# Patient Record
Sex: FEMALE | Race: WHITE | Employment: PART TIME | URBAN - METROPOLITAN AREA
[De-identification: names, ages, dates, MRNs, and addresses within clinical notes are randomized per-mention and may not be internally consistent; named-entity substitution may affect disease eponyms.]

---

## 2023-09-08 ENCOUNTER — HOSPITAL ENCOUNTER (EMERGENCY)
Facility: HOSPITAL | Age: 28
Discharge: HOME | End: 2023-09-08
Attending: STUDENT IN AN ORGANIZED HEALTH CARE EDUCATION/TRAINING PROGRAM

## 2023-09-08 ENCOUNTER — APPOINTMENT (OUTPATIENT)
Facility: HOSPITAL | Age: 28
End: 2023-09-08

## 2023-09-08 VITALS
RESPIRATION RATE: 18 BRPM | HEIGHT: 60 IN | OXYGEN SATURATION: 97 % | SYSTOLIC BLOOD PRESSURE: 114 MMHG | BODY MASS INDEX: 25.02 KG/M2 | TEMPERATURE: 98.2 F | DIASTOLIC BLOOD PRESSURE: 81 MMHG | WEIGHT: 127.43 LBS | HEART RATE: 94 BPM

## 2023-09-08 DIAGNOSIS — N83.202 CYST OF LEFT OVARY: Primary | ICD-10-CM

## 2023-09-08 LAB
ALBUMIN SERPL-MCNC: 3.8 G/DL (ref 3.5–5)
ALBUMIN/GLOB SERPL: 1 (ref 1.1–2.2)
ALP SERPL-CCNC: 60 U/L (ref 45–117)
ALT SERPL-CCNC: 18 U/L (ref 12–78)
ANION GAP SERPL CALC-SCNC: 6 MMOL/L (ref 5–15)
APPEARANCE UR: ABNORMAL
AST SERPL-CCNC: 9 U/L (ref 15–37)
BACTERIA URNS QL MICRO: ABNORMAL /HPF
BASOPHILS # BLD: 0 K/UL (ref 0–0.1)
BASOPHILS NFR BLD: 0 % (ref 0–1)
BILIRUB SERPL-MCNC: 0.6 MG/DL (ref 0.2–1)
BILIRUB UR QL: NEGATIVE
BUN SERPL-MCNC: 10 MG/DL (ref 6–20)
BUN/CREAT SERPL: 11 (ref 12–20)
CALCIUM SERPL-MCNC: 9.1 MG/DL (ref 8.5–10.1)
CHLORIDE SERPL-SCNC: 106 MMOL/L (ref 97–108)
CO2 SERPL-SCNC: 27 MMOL/L (ref 21–32)
COLOR UR: ABNORMAL
COMMENT:: NORMAL
CREAT SERPL-MCNC: 0.93 MG/DL (ref 0.55–1.02)
DIFFERENTIAL METHOD BLD: ABNORMAL
EOSINOPHIL # BLD: 0.1 K/UL (ref 0–0.4)
EOSINOPHIL NFR BLD: 1 % (ref 0–7)
EPITH CASTS URNS QL MICRO: ABNORMAL /LPF
ERYTHROCYTE [DISTWIDTH] IN BLOOD BY AUTOMATED COUNT: 14.1 % (ref 11.5–14.5)
GLOBULIN SER CALC-MCNC: 3.7 G/DL (ref 2–4)
GLUCOSE SERPL-MCNC: 103 MG/DL (ref 65–100)
GLUCOSE UR STRIP.AUTO-MCNC: NEGATIVE MG/DL
HCG UR QL: NEGATIVE
HCT VFR BLD AUTO: 50.2 % (ref 35–47)
HGB BLD-MCNC: 16.3 G/DL (ref 11.5–16)
HGB UR QL STRIP: ABNORMAL
IMM GRANULOCYTES # BLD AUTO: 0 K/UL
IMM GRANULOCYTES NFR BLD AUTO: 0 %
KETONES UR QL STRIP.AUTO: ABNORMAL MG/DL
LEUKOCYTE ESTERASE UR QL STRIP.AUTO: NEGATIVE
LIPASE SERPL-CCNC: 69 U/L (ref 73–393)
LYMPHOCYTES # BLD: 3.2 K/UL (ref 0.8–3.5)
LYMPHOCYTES NFR BLD: 26 % (ref 12–49)
MCH RBC QN AUTO: 26.4 PG (ref 26–34)
MCHC RBC AUTO-ENTMCNC: 32.5 G/DL (ref 30–36.5)
MCV RBC AUTO: 81.2 FL (ref 80–99)
MONOCYTES # BLD: 1 K/UL (ref 0–1)
MONOCYTES NFR BLD: 8 % (ref 5–13)
MUCOUS THREADS URNS QL MICRO: ABNORMAL /LPF
NEUTS SEG # BLD: 7.9 K/UL (ref 1.8–8)
NEUTS SEG NFR BLD: 65 % (ref 32–75)
NITRITE UR QL STRIP.AUTO: NEGATIVE
PH UR STRIP: 5.5 (ref 5–8)
PLATELET # BLD AUTO: 209 K/UL (ref 150–400)
POTASSIUM SERPL-SCNC: 3.7 MMOL/L (ref 3.5–5.1)
PROT SERPL-MCNC: 7.5 G/DL (ref 6.4–8.2)
PROT UR STRIP-MCNC: ABNORMAL MG/DL
RBC # BLD AUTO: 6.18 M/UL (ref 3.8–5.2)
RBC #/AREA URNS HPF: ABNORMAL /HPF (ref 0–5)
RBC MORPH BLD: ABNORMAL
SODIUM SERPL-SCNC: 139 MMOL/L (ref 136–145)
SP GR UR REFRACTOMETRY: 1.02 (ref 1–1.03)
SPECIMEN HOLD: NORMAL
SPECIMEN HOLD: NORMAL
UROBILINOGEN UR QL STRIP.AUTO: 1 EU/DL (ref 0.2–1)
WBC # BLD AUTO: 12.2 K/UL (ref 3.6–11)
WBC URNS QL MICRO: ABNORMAL /HPF (ref 0–4)

## 2023-09-08 PROCEDURE — 76856 US EXAM PELVIC COMPLETE: CPT

## 2023-09-08 PROCEDURE — 76830 TRANSVAGINAL US NON-OB: CPT

## 2023-09-08 PROCEDURE — 99285 EMERGENCY DEPT VISIT HI MDM: CPT

## 2023-09-08 PROCEDURE — 80053 COMPREHEN METABOLIC PANEL: CPT

## 2023-09-08 PROCEDURE — 36415 COLL VENOUS BLD VENIPUNCTURE: CPT

## 2023-09-08 PROCEDURE — 96374 THER/PROPH/DIAG INJ IV PUSH: CPT

## 2023-09-08 PROCEDURE — 2580000003 HC RX 258

## 2023-09-08 PROCEDURE — 6360000002 HC RX W HCPCS

## 2023-09-08 PROCEDURE — 81001 URINALYSIS AUTO W/SCOPE: CPT

## 2023-09-08 PROCEDURE — 83690 ASSAY OF LIPASE: CPT

## 2023-09-08 PROCEDURE — 96375 TX/PRO/DX INJ NEW DRUG ADDON: CPT

## 2023-09-08 PROCEDURE — 85025 COMPLETE CBC W/AUTO DIFF WBC: CPT

## 2023-09-08 PROCEDURE — 6360000004 HC RX CONTRAST MEDICATION: Performed by: EMERGENCY MEDICINE

## 2023-09-08 PROCEDURE — 74177 CT ABD & PELVIS W/CONTRAST: CPT

## 2023-09-08 PROCEDURE — 81025 URINE PREGNANCY TEST: CPT

## 2023-09-08 RX ORDER — ONDANSETRON 2 MG/ML
4 INJECTION INTRAMUSCULAR; INTRAVENOUS ONCE
Status: COMPLETED | OUTPATIENT
Start: 2023-09-08 | End: 2023-09-08

## 2023-09-08 RX ORDER — 0.9 % SODIUM CHLORIDE 0.9 %
1000 INTRAVENOUS SOLUTION INTRAVENOUS ONCE
Status: COMPLETED | OUTPATIENT
Start: 2023-09-08 | End: 2023-09-08

## 2023-09-08 RX ORDER — KETOROLAC TROMETHAMINE 30 MG/ML
30 INJECTION, SOLUTION INTRAMUSCULAR; INTRAVENOUS
Status: COMPLETED | OUTPATIENT
Start: 2023-09-08 | End: 2023-09-08

## 2023-09-08 RX ADMIN — ONDANSETRON 4 MG: 2 INJECTION INTRAMUSCULAR; INTRAVENOUS at 19:43

## 2023-09-08 RX ADMIN — SODIUM CHLORIDE 1000 ML: 9 INJECTION, SOLUTION INTRAVENOUS at 19:42

## 2023-09-08 RX ADMIN — IOPAMIDOL 100 ML: 755 INJECTION, SOLUTION INTRAVENOUS at 21:18

## 2023-09-08 RX ADMIN — KETOROLAC TROMETHAMINE 30 MG: 30 INJECTION, SOLUTION INTRAMUSCULAR at 19:43

## 2023-09-08 ASSESSMENT — ENCOUNTER SYMPTOMS
VOMITING: 0
SORE THROAT: 0
ABDOMINAL PAIN: 1
NAUSEA: 1

## 2023-09-08 ASSESSMENT — PAIN DESCRIPTION - LOCATION: LOCATION: ABDOMEN

## 2023-09-08 ASSESSMENT — PAIN DESCRIPTION - ORIENTATION: ORIENTATION: LEFT

## 2023-09-08 ASSESSMENT — PAIN - FUNCTIONAL ASSESSMENT: PAIN_FUNCTIONAL_ASSESSMENT: 0-10

## 2023-09-08 ASSESSMENT — PAIN SCALES - GENERAL: PAINLEVEL_OUTOF10: 10

## 2023-09-08 NOTE — ED PROVIDER NOTES
[facility] EMERGENCY DEP  EMERGENCY DEPARTMENT ENCOUNTER      Pt Name: Ceola Burkitt  MRN: [id number]  [address] [facility] 1995  Date of evaluation: 9/8/2023  Provider: MILAD Lagos - NP    [address]       Chief Complaint   Patient presents with    Flank Pain         HISTORY OF PRESENT ILLNESS   (Location/Symptom, Timing/Onset, Context/Setting, Quality, Duration, Modifying Factors, Severity)  Note limiting factors. Ceola Burkitt is a 24 y.o. female presenting to the ED c/o left sided abdominal pain that just started while eating about 1 hour prior. Pt reports pain feels similar to previous ovarian cyst rupture where she required hospitalization. + urinary frequency. + nausea. Pt reports being off birth control for a while. Denies vaginal bleeding, dysuria, hematuria, vomiting, pregnancy. Medical hx: ovarian cyst, osteochondritis  Surgical hx: tonsillectomy, wisdom teeth removal  Social hx: admits to smoking, admits to marijuana use, denies drug use, social use of etoh use. The history is provided by the patient. No  was used. Review of External Medical Records:     Nursing Notes were reviewed. REVIEW OF SYSTEMS    (2-9 systems for level 4, 10 or more for level 5)     Review of Systems   Constitutional:  Positive for diaphoresis. Negative for activity change and appetite change. HENT:  Negative for sore throat. Gastrointestinal:  Positive for abdominal pain and nausea. Negative for vomiting. Genitourinary:  Positive for frequency. Negative for decreased urine volume, difficulty urinating, dysuria and hematuria. Musculoskeletal:  Negative for myalgias. Skin:  Negative for rash. All other systems reviewed and are negative. Except as noted above the remainder of the review of systems was reviewed and negative. PAST MEDICAL HISTORY   No past medical history on file. SURGICAL HISTORY     No past surgical history on file.       CURRENT

## 2023-09-08 NOTE — ED TRIAGE NOTES
Patient arrives to ER with c/o right lower quad pain after eating. Patient said she went to the bathroom thinking she needed to have a bowel movement and became diaphoretic. Patient thinks it's a ruptured ovarian cyst bc she has had one in the past.    Patient appears pale and diaphoretic in triage.

## 2023-09-09 NOTE — DISCHARGE INSTRUCTIONS
Take ibuprofen 800 mg every 8 hours for the pain. You may also take tylenol in between the ibuprofen to help with pain. Take the ibuprofen with food. Follow-up with OBGYN in 3-4 days for reevaluation. Call for appointment as soon as possible.

## 2024-09-06 ENCOUNTER — HOSPITAL ENCOUNTER (EMERGENCY)
Facility: HOSPITAL | Age: 29
Discharge: HOME | End: 2024-09-06
Attending: EMERGENCY MEDICINE

## 2024-09-06 VITALS
SYSTOLIC BLOOD PRESSURE: 127 MMHG | WEIGHT: 112.43 LBS | HEIGHT: 60 IN | TEMPERATURE: 98.3 F | DIASTOLIC BLOOD PRESSURE: 85 MMHG | RESPIRATION RATE: 18 BRPM | HEART RATE: 91 BPM | BODY MASS INDEX: 22.07 KG/M2 | OXYGEN SATURATION: 95 %

## 2024-09-06 DIAGNOSIS — N93.8 DYSFUNCTIONAL UTERINE BLEEDING: Primary | ICD-10-CM

## 2024-09-06 LAB
APPEARANCE UR: CLEAR
BACTERIA URNS QL MICRO: NEGATIVE /HPF
BILIRUB UR QL: NEGATIVE
CLUE CELLS VAG QL WET PREP: NORMAL
COLOR UR: NORMAL
EPITH CASTS URNS QL MICRO: NORMAL /LPF
GLUCOSE UR STRIP.AUTO-MCNC: NEGATIVE MG/DL
HCG UR QL: NEGATIVE
HGB UR QL STRIP: NEGATIVE
HYALINE CASTS URNS QL MICRO: NORMAL /LPF (ref 0–5)
KETONES UR QL STRIP.AUTO: NEGATIVE MG/DL
LEUKOCYTE ESTERASE UR QL STRIP.AUTO: NEGATIVE
NITRITE UR QL STRIP.AUTO: NEGATIVE
PH UR STRIP: 7 (ref 5–8)
PROT UR STRIP-MCNC: NEGATIVE MG/DL
RBC #/AREA URNS HPF: NORMAL /HPF (ref 0–5)
SP GR UR REFRACTOMETRY: 1.02 (ref 1–1.03)
SPECIMEN HOLD: NORMAL
T VAGINALIS VAG QL WET PREP: NORMAL
UROBILINOGEN UR QL STRIP.AUTO: 1 EU/DL (ref 0.2–1)
WBC URNS QL MICRO: NORMAL /HPF (ref 0–4)
YEAST: NORMAL

## 2024-09-06 PROCEDURE — 87491 CHLMYD TRACH DNA AMP PROBE: CPT

## 2024-09-06 PROCEDURE — 81025 URINE PREGNANCY TEST: CPT

## 2024-09-06 PROCEDURE — 87591 N.GONORRHOEAE DNA AMP PROB: CPT

## 2024-09-06 PROCEDURE — 81001 URINALYSIS AUTO W/SCOPE: CPT

## 2024-09-06 PROCEDURE — 87210 SMEAR WET MOUNT SALINE/INK: CPT

## 2024-09-06 PROCEDURE — 99283 EMERGENCY DEPT VISIT LOW MDM: CPT

## 2024-09-06 ASSESSMENT — PAIN - FUNCTIONAL ASSESSMENT: PAIN_FUNCTIONAL_ASSESSMENT: NONE - DENIES PAIN

## 2024-09-06 NOTE — ED TRIAGE NOTES
Patient reports vaginal bleeding after intercourse with partner. Denies any dysuria. Denies any chance of STD,

## 2024-09-06 NOTE — ED PROVIDER NOTES
[facility] EMERGENCY DEP  EMERGENCY DEPARTMENT ENCOUNTER      Pt Name: Es Chavez  MRN: [id number]  Birthdate 1995  Date of evaluation: 9/6/2024  Provider: Linda Musa PA-C    CHIEF COMPLAINT       Chief Complaint   Patient presents with    Vaginal Bleeding         HISTORY OF PRESENT ILLNESS   (Location/Symptom, Timing/Onset, Context/Setting, Quality, Duration, Modifying Factors, Severity)  Note limiting factors.   The patient is a 28-year-old female presents emergency department with concern for vaginal bleeding.  The patient states that she was on vacation couple weeks ago, she had intercourse, this was about 8/19, and then she began some spotting, she then had more of a normal flow,.  Her previousl cycle was on 7/14, so she was a few days late.  She then completed the cycle and had intercourse again, followed again, but vaginal bleeding, and now is continuing to have some vaginal bleeding.  She is concerned that she could be pregnant.  But she thinks it was too early to take a test.  She denies any vaginal discharge, she is having slight suprapubic cramping, no back pain, no fever or chills.  And denies possible STI.  There was no pain with intercourse.          Review of External Medical Records:     Nursing Notes were reviewed.    REVIEW OF SYSTEMS    (2-9 systems for level 4, 10 or more for level 5)     Review of Systems    Except as noted above the remainder of the review of systems was reviewed and negative.       PAST MEDICAL HISTORY   No past medical history on file.      SURGICAL HISTORY     No past surgical history on file.      CURRENT MEDICATIONS       There are no discharge medications for this patient.      ALLERGIES     Codeine    FAMILY HISTORY     No family history on file.       SOCIAL HISTORY       Social History     Socioeconomic History    Marital status: Single           PHYSICAL EXAM    (up to 7 for level 4, 8 or more for level 5)     ED Triage Vitals [09/06/24 1603]   BP Systolic

## 2024-09-06 NOTE — DISCHARGE INSTRUCTIONS
Consider being evaluated at [facility], and return to the ED if you have pain, passing blood and soaking more than 3 pads per hour.     We will notify you of the results of GC and Chlamydia

## 2024-09-09 LAB
C TRACH RRNA SPEC QL NAA+PROBE: NEGATIVE
N GONORRHOEA RRNA SPEC QL NAA+PROBE: NEGATIVE
SPECIMEN SOURCE: NORMAL